# Patient Record
(demographics unavailable — no encounter records)

---

## 2025-07-17 NOTE — ASSESSMENT
[FreeTextEntry1] : Lumbar radiculopathy, Gr 2-3 L5-S1 spondylolisthesis  HEP  Patient has failed 6 weeks of conservative care, including physical therapy and medications. Will obtain MRI to rule out HNP; will also be used to guide potential future injections/surgical management.  Pain management referral  Follow up after MRI  NSAIDs- Patient warned of risk of medication to GI tract, increased blood pressure, cardiac risk, and risk of fluid retention.  Advised to clear medication with internist or PCP if any concurrent health problem with heart, blood pressure, or GI system exists.  Gabapentin- Patient advised of sedating effects, instructed not to drive, operate machinery, or take with other sedating medications. Advised of need to taper on/off medication and risk of abruptly stopping gabapentin.

## 2025-07-17 NOTE — IMAGING
[de-identified] : LSPINE Inspection: No rash or ecchymosis Palpation: Tenderness to palpation or spasm in bilateral thoracic and lumbar paraspinal musculature, SI joint tenderness to palpation ROM: Limited with pain Strength: 5/5 bilateral hip flexors, knee extensors, ankle dorsiflexors, EHL, ankle plantarflexors Sensation: Sensation present to light touch bilateral L2-S1 distributions Provocative maneuvers: Positive bilateral straight leg raise  Bilateral hips- Palpation: No tenderness to palpation over greater trochanter or IT band ROM: No pain with flexion and internal rotation  [Facet arthropathy] : Facet arthropathy [Disc space narrowing] : Disc space narrowing [Spondylolithesis] : Spondylolithesis [Spondylolysis] : Spondylolysis [FreeTextEntry1] : Gr 2-3 L5-S1 spondylolisthesis

## 2025-07-17 NOTE — HISTORY OF PRESENT ILLNESS
[8] : 8 [6] : 6 [Dull/Aching] : dull/aching [Radiating] : radiating [Sharp] : sharp [Shooting] : shooting [Throbbing] : throbbing [Constant] : constant [Household chores] : household chores [Leisure] : leisure [Sleep] : sleep [Social interactions] : social interactions [Nothing helps with pain getting better] : Nothing helps with pain getting better [de-identified] : 07/17/2025: Patient here for evaluation of her lower back. Patient states pain began around 6 months ago and has been worsening since onset. Patient reports the pain in her lower back is radiating to her hips down to her legs. She is experiencing numbness and tingling in her lower extremities. She previously saw Dr. Murillo 6/30/2025, and was given Medrol dose pack and PT, without significant relief. [] : no [FreeTextEntry1] : Lower Back [FreeTextEntry7] : Down to her hips and legs.

## 2025-07-28 NOTE — PHYSICAL EXAM
[de-identified] : Gen: NAD Head: NC/AT Eyes: no glasses, no scleral icterus ENT: mucous membranes moist CV: No JVD Lungs: nonlabored breathing Abd: soft, NT/ND Ext: full ROM in all extremities, no peripheral edema Back: +TTP in the bilateral low lumbar facet region, +SLR bilaterally Neuro: CN intact LEs +5 L +5 R hip flexion +5 L +5 R leg extension +5 L +5 R leg flexion +5 L +5 R foot dorsiflexion +5 L +5 R foot plantarflexion +5 L +5 R EHL extension Psych: normal affect Skin: no visible lesions

## 2025-07-28 NOTE — DISCUSSION/SUMMARY
[de-identified] : FIGUEROA MADRID is a 81 year-old woman presenting for a NPV for a history of chronic low back pain.   Prior treatment: Physical therapy x4 weeks (2025) Oral steroid taper Gabapentin Meloxicam Patient has participated and failed at least 6 weeks of conservative therapy including physical therapy and a prescribed home exercise program as well as 6 weeks of activity modifications, including heat, ice, rest, and over the counter medications.  Plan: 1) MRI lumbar spine images reviewed with the patient. 2) Schedule L3-L4 ILESI w/o MAC. The procedure was explained to the patient in detail. Reviewed risks, benefits, and alternatives with the patient. Some risks discussed included temporary increase in pain, bleeding, infection, and side effects from steroids. The patient expressed understanding and would like to proceed. 3) Restart meloxicam 15mg daily prn; denies AEs 4) Continue acetaminophen prn 5) Continue home exercise regimen 6) RTC post procedure

## 2025-07-28 NOTE — HISTORY OF PRESENT ILLNESS
[Lower back] : lower back [Buttock] : buttock [Right Leg] : right leg [10] : 10 [9] : 9 [Burning] : burning [Radiating] : radiating [Sharp] : sharp [Shooting] : shooting [Stabbing] : stabbing [Throbbing] : throbbing [Tightness] : tightness [Tingling] : tingling [Constant] : constant [Household chores] : household chores [Leisure] : leisure [Sleep] : sleep [Meds] : meds [Sitting] : sitting [Standing] : standing [Physical therapy] : physical therapy [FreeTextEntry1] : 7/28/2025: FIGUEROA MADRID is a 81 year-old woman presenting for a NPV for a history of chronic low back pain. Patient notes a longstanding history of low back pain which has gotten significantly worse over the past two months. Has done PT for 4 weeks without improvement of pain. Has been prescribed meloxicam, gabapentin, and oral steroids without relief. At this point, notes an aching pain across the low lumbar region w/ radiation to the right > left posterior thigh and leg.  The patient states that average pain over the past week was 9/10 in severity. Mood: No depression or anxiety.  Sleep: Patient notes significant difficulty with sleep 2/2 pain.  [] : no [FreeTextEntry6] : numbness and tingling in R leg and foot  [FreeTextEntry7] : r leg  [FreeTextEntry9] : Tylenol,  [de-identified] : Gabapentin

## 2025-07-28 NOTE — DISCUSSION/SUMMARY
[de-identified] : FIGUEROA MADRID is a 81 year-old woman presenting for a NPV for a history of chronic low back pain.   Prior treatment: Physical therapy x4 weeks (2025) Oral steroid taper Gabapentin Meloxicam Patient has participated and failed at least 6 weeks of conservative therapy including physical therapy and a prescribed home exercise program as well as 6 weeks of activity modifications, including heat, ice, rest, and over the counter medications.  Plan: 1) MRI lumbar spine images reviewed with the patient. 2) Schedule L3-L4 ILESI w/o MAC. The procedure was explained to the patient in detail. Reviewed risks, benefits, and alternatives with the patient. Some risks discussed included temporary increase in pain, bleeding, infection, and side effects from steroids. The patient expressed understanding and would like to proceed. 3) Restart meloxicam 15mg daily prn; denies AEs 4) Continue acetaminophen prn 5) Continue home exercise regimen 6) RTC post procedure

## 2025-07-28 NOTE — DATA REVIEWED
[MRI] : MRI [Lumbar Spine] : lumbar spine [Report was reviewed and noted in the chart] : The report was reviewed and noted in the chart [I independently reviewed and interpreted images and report] : I independently reviewed and interpreted images and report [FreeTextEntry1] : 7/21/2025 MRI Lumbar Spine O&C L3-L4: diffuse disc bulge, ligamentum thickening, broad based central protrusion, mild bilateral facet OA; SEVERE central stenosis w/ mild to moderate bilateral NF stenosis L4-L5: mild bilateral facet OA w/ effusions L5-S1: anterolisthesis, chronic bilateral L5 spondylolysis, small disc bulge, mild central stenosis, mild to moderate bilateral NF stenosis

## 2025-07-28 NOTE — PHYSICAL EXAM
[de-identified] : Gen: NAD Head: NC/AT Eyes: no glasses, no scleral icterus ENT: mucous membranes moist CV: No JVD Lungs: nonlabored breathing Abd: soft, NT/ND Ext: full ROM in all extremities, no peripheral edema Back: +TTP in the bilateral low lumbar facet region, +SLR bilaterally Neuro: CN intact LEs +5 L +5 R hip flexion +5 L +5 R leg extension +5 L +5 R leg flexion +5 L +5 R foot dorsiflexion +5 L +5 R foot plantarflexion +5 L +5 R EHL extension Psych: normal affect Skin: no visible lesions

## 2025-07-28 NOTE — HISTORY OF PRESENT ILLNESS
[Lower back] : lower back [Buttock] : buttock [Right Leg] : right leg [10] : 10 [9] : 9 [Burning] : burning [Radiating] : radiating [Sharp] : sharp [Shooting] : shooting [Stabbing] : stabbing [Throbbing] : throbbing [Tightness] : tightness [Tingling] : tingling [Constant] : constant [Household chores] : household chores [Leisure] : leisure [Sleep] : sleep [Meds] : meds [Sitting] : sitting [Standing] : standing [Physical therapy] : physical therapy [FreeTextEntry1] : 7/28/2025: FIGUEROA MADRID is a 81 year-old woman presenting for a NPV for a history of chronic low back pain. Patient notes a longstanding history of low back pain which has gotten significantly worse over the past two months. Has done PT for 4 weeks without improvement of pain. Has been prescribed meloxicam, gabapentin, and oral steroids without relief. At this point, notes an aching pain across the low lumbar region w/ radiation to the right > left posterior thigh and leg.  The patient states that average pain over the past week was 9/10 in severity. Mood: No depression or anxiety.  Sleep: Patient notes significant difficulty with sleep 2/2 pain.  [] : no [FreeTextEntry6] : numbness and tingling in R leg and foot  [FreeTextEntry7] : r leg  [FreeTextEntry9] : Tylenol,  [de-identified] : Gabapentin